# Patient Record
Sex: FEMALE | Race: WHITE
[De-identification: names, ages, dates, MRNs, and addresses within clinical notes are randomized per-mention and may not be internally consistent; named-entity substitution may affect disease eponyms.]

---

## 2019-09-27 ENCOUNTER — HOSPITAL ENCOUNTER (OUTPATIENT)
Dept: HOSPITAL 19 - SDCO | Age: 39
Discharge: HOME | End: 2019-09-27
Attending: ORTHOPAEDIC SURGERY
Payer: COMMERCIAL

## 2019-09-27 VITALS — DIASTOLIC BLOOD PRESSURE: 86 MMHG | SYSTOLIC BLOOD PRESSURE: 130 MMHG | HEART RATE: 59 BPM

## 2019-09-27 VITALS — DIASTOLIC BLOOD PRESSURE: 79 MMHG | SYSTOLIC BLOOD PRESSURE: 129 MMHG | HEART RATE: 84 BPM | TEMPERATURE: 98.4 F

## 2019-09-27 VITALS — WEIGHT: 228.62 LBS | HEIGHT: 69 IN | BODY MASS INDEX: 33.86 KG/M2

## 2019-09-27 VITALS — HEART RATE: 69 BPM | DIASTOLIC BLOOD PRESSURE: 70 MMHG | TEMPERATURE: 97.7 F | SYSTOLIC BLOOD PRESSURE: 142 MMHG

## 2019-09-27 DIAGNOSIS — Z79.899: ICD-10-CM

## 2019-09-27 DIAGNOSIS — I10: ICD-10-CM

## 2019-09-27 DIAGNOSIS — Z82.61: ICD-10-CM

## 2019-09-27 DIAGNOSIS — Z87.891: ICD-10-CM

## 2019-09-27 DIAGNOSIS — B07.8: Primary | ICD-10-CM

## 2019-09-27 DIAGNOSIS — Z82.49: ICD-10-CM

## 2019-09-27 NOTE — NUR
IV site discontinued with all parts intact. Discharge instructions reviewed.
Pt voices understanding.

## 2019-09-27 NOTE — NUR
Postop shoe placed on pt per orders. Pt requesting to go home. Pt up to dress.
Call light within reach.

## 2019-09-27 NOTE — NUR
Pt to [facility] via cart from OR. Pt awake and alert. Denies pain or nausea.
Ace wrap dressing to right foot is clean, dry, and intact. Right toes are
warm, dry, and pink. Pt able to move right toes and has full sensation to
them. Muffin and cola given per pt request.  at bedside. Call light
within reach.

## 2019-10-11 ENCOUNTER — HOSPITAL ENCOUNTER (OUTPATIENT)
Dept: HOSPITAL 19 - COL.RAD | Age: 39
End: 2019-10-11
Attending: ORTHOPAEDIC SURGERY
Payer: COMMERCIAL

## 2019-10-11 DIAGNOSIS — M19.072: Primary | ICD-10-CM
